# Patient Record
(demographics unavailable — no encounter records)

---

## 2025-07-28 NOTE — HISTORY OF PRESENT ILLNESS
[FreeTextEntry1] : Patient is a 43 year-old G2, P2 with LMP of 2025 here for a routine annual gynecological follow-up without complaints.  Pt's last mammogram and sonogram was 2025 at [facility]. Obtained by PCP every 6 months for breast cysts. Patient's last colonoscopy was done 2 years ago due to sister with hx of colon cancer.   OB history:  x 2 GYN history: Normal pap smears. No fibroids, No STDs Last sexually active 10 years ago.  Past medical history: Breast cysts Past surgical history: None Medications: None Allergies: NKDA Family history: Sister with hx of colon cancer at age 5, genetic testing negative. Brother with hx of kidney cancer. Parents with HTN, diabetes, Mother with hx of heart disease. Mother with hx of hyperthyroid treated with iodine and now hypothyroid. Sister with hypothyroid. No diabetes. No GYN cancer. No history of breast or ovarian cancer. Social history: No alcohol. No smoking.  Review of Systems: None.

## 2025-07-28 NOTE — PLAN
[FreeTextEntry1] : Patient here for a routine annual gynecological follow-up. Patient with normal gynecological exam. Pap smear was done. HPV was done.  Breast self-examination instructed. We will obtain mammogram/sonogram imaging from [facility]. New referral provided today for mammogram/sonogram for breast cysts, Patient to follow-up for annual gynecological follow-up or as needed. PHQ-9 questionnaire reviewed with patient. Patient scored 2. No intervention needed. Total 5 minutes spent with the patient regarding questionnaire.  I, Nini Lim, acted solely as a scribe for Dr. Red Chino on this date 07/28/2025.  All medical record entries made by the Scribe were at my, Dr. Red Chino, direction and personally dictated by me on 07/28/2025. I have reviewed the chart and agree that the record accurately reflects my personal performance of the history, physical exam, assessment and plan. I have also personally directed, reviewed, and agreed with the chart.